# Patient Record
Sex: MALE | Race: BLACK OR AFRICAN AMERICAN | NOT HISPANIC OR LATINO | URBAN - METROPOLITAN AREA
[De-identification: names, ages, dates, MRNs, and addresses within clinical notes are randomized per-mention and may not be internally consistent; named-entity substitution may affect disease eponyms.]

---

## 2019-01-02 ENCOUNTER — IMPORTED ENCOUNTER (OUTPATIENT)
Dept: URBAN - METROPOLITAN AREA CLINIC 9 | Facility: CLINIC | Age: 54
End: 2019-01-02

## 2020-02-03 ENCOUNTER — IMPORTED ENCOUNTER (OUTPATIENT)
Dept: URBAN - METROPOLITAN AREA CLINIC 9 | Facility: CLINIC | Age: 55
End: 2020-02-03

## 2021-02-01 ENCOUNTER — IMPORTED ENCOUNTER (OUTPATIENT)
Dept: URBAN - METROPOLITAN AREA CLINIC 9 | Facility: CLINIC | Age: 56
End: 2021-02-01

## 2021-02-01 PROBLEM — E11.9: Noted: 2021-02-01

## 2021-02-01 PROBLEM — H04.123: Noted: 2021-02-01

## 2021-04-21 NOTE — PATIENT DISCUSSION
No FB or abrasions noted today. Call if symptoms worsen, do not improve, or if new symptoms or vision changes occur.

## 2021-04-21 NOTE — PATIENT DISCUSSION
Pt accompanied to today's appt by family member. All findings and plans explained to Pt and family member.

## 2021-04-21 NOTE — PATIENT DISCUSSION
Explained condition and importance of monitoring. Recommended Pt to start AREDS 2 Supplements and to monitor [place] Grid for changes. Eat healthy and wear sunglasses. Call with any vision or [place]ms[place] changes.

## 2021-04-21 NOTE — PATIENT DISCUSSION
Recommended regular use of ATs (Soothe or GentTeal) 3-4 times during the day and Gel qhs. Refresh Optive Gel sample given today.

## 2021-04-21 NOTE — PATIENT DISCUSSION
Pt currently taking Dorzolamide-Timolol bid. Explained that glaucoma drop may be contributing to increased irritation from PRISCILLA and SPK. Recommended changing to PF Cosopt. Explained Vainupea 50 and pharmacy handout given. E-Rx sent there.

## 2021-04-21 NOTE — PATIENT DISCUSSION
Monitor for IOP and NFL changes with visits and OCTs. Patient with one or more new problems requiring additional work-up/treatment.

## 2021-10-16 ASSESSMENT — TONOMETRY
OD_IOP_MMHG: 16
OD_IOP_MMHG: 19
OS_IOP_MMHG: 19
OD_IOP_MMHG: 15
OS_IOP_MMHG: 16
OS_IOP_MMHG: 15

## 2021-10-16 ASSESSMENT — VISUAL ACUITY
OD_SC: 20/30 - SN
OS_CC: 20/20 SN
OD_CC: 20/25 SN
OD_SC: 20/30 SN
OS_CC: 20/20 SN
OS_SC: 20/30 -2 SN
OD_CC: 20/20 SN
OD_CC: 20/20 SN
OS_SC: 20/40 -2 SN
OD_SC: 20/30 + SN
OS_CC: 20/20 SN
OS_CC: 20/25 - SN
OD_CC: 20/20 SN
OS_SC: 20/30 + SN

## 2021-10-27 NOTE — PATIENT DISCUSSION
Take AREDS 2 Supplements and monitor [place] Grid for changes. Eat healthy and wear sunglasses. Call with any vision or Amsler changes.

## 2021-10-27 NOTE — PATIENT DISCUSSION
Explained chronic condition and handout given. Recommended regular use of ATs and starting a hot compress and lid hygiene routine.

## 2021-10-27 NOTE — PATIENT DISCUSSION
Pt tried PF Cosopt to see if improved PRISCILLA/SPK symptoms, but minimal improvement was not worth the cost and went back to Dorzolamide-Timolol bid.

## 2022-02-23 ENCOUNTER — COMPREHENSIVE EXAM (OUTPATIENT)
Dept: URBAN - METROPOLITAN AREA CLINIC 17 | Facility: CLINIC | Age: 57
End: 2022-02-23

## 2022-02-23 DIAGNOSIS — E11.9: ICD-10-CM

## 2022-02-23 DIAGNOSIS — H04.123: ICD-10-CM

## 2022-02-23 PROCEDURE — 92014 COMPRE OPH EXAM EST PT 1/>: CPT

## 2022-02-23 PROCEDURE — 92015 DETERMINE REFRACTIVE STATE: CPT

## 2022-02-23 ASSESSMENT — TONOMETRY
OD_IOP_MMHG: 14
OS_IOP_MMHG: 14

## 2022-02-23 ASSESSMENT — VISUAL ACUITY
OD_CC: J1
OD_CC: 20/20
OS_CC: 20/20
OS_CC: J1

## 2022-04-27 NOTE — PATIENT DISCUSSION
Continue regular use of ATs (Soothe or GentTeal) at least 3-4 times during the day and recommended adding PM Refresh kory qhs. Refresh PM coupon sample given today.

## 2022-06-10 NOTE — PATIENT DISCUSSION
Continue regular use of ATs (Soothe or GentTeal) at least 3-4 times during the day and recommended adding Genteal Gel QHS.

## 2023-04-12 ENCOUNTER — ESTABLISHED PATIENT (OUTPATIENT)
Dept: URBAN - METROPOLITAN AREA CLINIC 17 | Facility: CLINIC | Age: 58
End: 2023-04-12

## 2023-04-12 DIAGNOSIS — H04.123: ICD-10-CM

## 2023-04-12 DIAGNOSIS — H25.13: ICD-10-CM

## 2023-04-12 DIAGNOSIS — E11.9: ICD-10-CM

## 2023-04-12 PROCEDURE — 92015 DETERMINE REFRACTIVE STATE: CPT

## 2023-04-12 PROCEDURE — 92014 COMPRE OPH EXAM EST PT 1/>: CPT

## 2023-04-12 ASSESSMENT — VISUAL ACUITY
OS_SC: 20/25
OD_SC: 20/25+1

## 2023-04-12 ASSESSMENT — TONOMETRY
OS_IOP_MMHG: 16
OD_IOP_MMHG: 16

## 2023-09-05 NOTE — PATIENT DISCUSSION
Patient achieved a 15% reduction in IOP from pretreatment levels or a plan is in place to achieve this goal. Finasteride Counseling:  I discussed with the patient the risks of use of finasteride including but not limited to decreased libido, decreased ejaculate volume, gynecomastia, and depression. Women should not handle medication.  All of the patient's questions and concerns were addressed. Finasteride Male Counseling: Finasteride Counseling:  I discussed with the patient the risks of use of finasteride including but not limited to decreased libido, decreased ejaculate volume, gynecomastia, and depression. Women should not handle medication.  All of the patient's questions and concerns were addressed.

## 2024-05-07 ENCOUNTER — ESTABLISHED PATIENT (OUTPATIENT)
Dept: URBAN - METROPOLITAN AREA CLINIC 17 | Facility: CLINIC | Age: 59
End: 2024-05-07

## 2024-05-07 DIAGNOSIS — E11.9: ICD-10-CM

## 2024-05-07 DIAGNOSIS — H52.223: ICD-10-CM

## 2024-05-07 DIAGNOSIS — H25.13: ICD-10-CM

## 2024-05-07 DIAGNOSIS — H04.123: ICD-10-CM

## 2024-05-07 PROCEDURE — 92015 DETERMINE REFRACTIVE STATE: CPT

## 2024-05-07 PROCEDURE — 92014 COMPRE OPH EXAM EST PT 1/>: CPT

## 2024-05-07 ASSESSMENT — TONOMETRY
OD_IOP_MMHG: 16
OS_IOP_MMHG: 16

## 2024-05-07 ASSESSMENT — VISUAL ACUITY
OS_CC: 20/25
OD_CC: 20/20-1